# Patient Record
Sex: FEMALE | Race: WHITE
[De-identification: names, ages, dates, MRNs, and addresses within clinical notes are randomized per-mention and may not be internally consistent; named-entity substitution may affect disease eponyms.]

---

## 2020-04-23 NOTE — D
[facility]
Kristin Saucedo
[place]   [address]                     DISCHARGE SUMMARY             
_______________________________________________________________________________
 
Name:       CAROL ALAS LEIDY                 Room #:         441-P       [facility] [place]  
M.R.#:      [id number]                       Account #:      [id number]  
Admission:  06/01/20    Attend Phys:    Krystian Pacheco MD   
Discharge:  06/03/20    Date of Birth:  [birth year]  
                                                          Report #: [id number]
                                                                    [id number]   
_______________________________________________________________________________
THIS REPORT FOR:  
 
cc:  Apple Leblanc MD, Sara A. MD Clymer, David J. MD                                           ~
THIS REPORT FOR:   //name//                          
 
CC: Krystian Leblanc
 
DATE OF SERVICE:  06/03/2020
 
 
FINAL DIAGNOSIS:  End-stage degenerative arthritis of her right hip.
 
OPERATIVE PROCEDURE:  Right total hip arthroplasty.
 
HISTORY OF PRESENT ILLNESS:  This generally active, fit and healthy 54-year-old
female presents with severe early degenerative arthritis involving multiple
joints.  This involves both hips and both knees to some extent.  The right hip
is the most severe.  She has decided to go ahead with right total hip
arthroplasty.
 
HOSPITAL COURSE:  The patient was admitted and taken to the operating room on
06/01/2020.  She underwent right total hip replacement, which she tolerated
quite nicely.  Postoperatively, her course was largely unremarkable.  She was
able to quickly advanced from IV analgesics to oral analgesics and resume a
regular diet.  She participated with physical therapy and made excellent
progress.  She was started on anticoagulation using Xarelto.  She has excellent
assistance at home and felt quickly that she was able to manage and was anxious
for hospital discharge.
 
DISCHARGE MEDICATIONS:  Include Xarelto 10 mg daily and hydrocodone 10 mg q.8
hours p.r.n. for pain.  She will continue moderate activity at home with
assistance from family.  I have asked her to call me or return should there be
any problems or questions.  I will otherwise plan to see her back in my office
in about 1 week postoperatively for routine followup.
 
 
 
 
 
 
 
                         
   By:                               
                   
D: 06/04/20 1704                           _____________________________________
T: 06/04/20 1714                           Krystian Pacheco MD             /nt

## 2020-05-22 NOTE — EKG
[facility]
Kristin Saucedo
[place]   [address]                     ELECTROCARDIOGRAM REPORT      
_______________________________________________________________________________
 
Name:       HERNANDO ALASJESSIE FORBES                 Room #:                     PRE IN  
M.R.#:      [id number]                       Account #:      [id number]  
Admission:              Attend Phys:    Krystian Pacheco MD   
Discharge:              Date of Birth:  [birth year]  
                                                          Report #: [id number]
                                                                    [id number]
_______________________________________________________________________________
THIS REPORT FOR:  
 
cc:  Apple Leblanc MD, Sara A. MD Lundgren,Ozzie LESLIE MD [facility]                                        ~
THIS REPORT FOR:   //name//                          
 
                          [facility]
                                       
Test Date:    2020               Test Time:    09:17:35
Pat Name:     CAROL ALAS                Department:   
Patient ID:   [id number]            Room:          
Gender:       F                        Technician:   LONA
:          1965               Requested By: Krystian Pacheco
Order Number: [id number] MD:   Ozzie Cortez
                                 Measurements
Intervals                              Axis          
Rate:         55                       P:            27
FL:           172                      QRS:          48
QRSD:         90                       T:            33
QT:           445                                    
QTc:          426                                    
                           Interpretive Statements
Sinus bradycardia
Otherwise normal tracing
No previous ECG available for comparison
 
Electronically Signed On 2020 8:39:21 CDT by Ozzie Cortez
[email or web address]
 
 
 
 
 
 
 
 
 
 
 
 
 
 
 
  <ELECTRONICALLY SIGNED>
   By: Ozzie Cortez MD, [facility]   
  20     0839
D: 05/21/20 0917                           _____________________________________
T: 20                           Ozzie Cortez MD, [facility]     /EPI

## 2020-06-01 NOTE — NUR
PATIENT IS NEW TO UNIT ROOM 441. PT ALERT XS 4. USES BSC TO URINATE. EATING
SNACK PACK AND APPLESAUCE. DRINKS WATER AND SPRITE. PICCO DRESSING TO RIGHT
HIP. HAS HEMOVAC TO RIGHT HIP. PT GIVEN PRN PAIN MED. PT IS ROOM AIR AND
REGULAR DIET,

## 2020-06-01 NOTE — NUR
PATIENT ADMITTED FROM OR ARRIVED ON THE UNIT AT 1330. PATIENT ALERT AND
ORIENTED X4. PATIENT HAS RIGHT HIP REYNALDO DRESSING, SCD'S, NICK HOSE, AND ICE
PACK, ALSO HEMOVAC IN PLACE. LEFT FOREARM IV IN PLACE. PATIENT C/O PAIN WITH
RIGHT HIP AREA, 8/10, OXYCODONE 1 TABLET GIVEN. IV FLUIDS STARTED. ADMISSION
DONE, EXCEPT CAREPLAN. REPORT GIVEN TO LISSET/RN. GODFREY/PT WORKED WITH THE
PATIENT, SHE FELT LIKE SHE WAS GOING TO PASS OUT, ONLY WALKED 10 FEET. WILL
CONTINUE TO MONITOR.

## 2020-06-02 NOTE — NUR
ASSUMED PATIENT CARE AT 0700. PATIENT DEREK X4.HAD TOTAL RT HIP REPLACEMENT.
PAIN OF 5-6 ON RT HIP, WELL  CONTROLLED BY PAIN MEDS.
PAIN TOLERATING WELL ON AMBULATION AND ON STAND BY ASST.
HEMOVAC REMOVED, REYNALDO DRESSING, SCD'S AND ICE PACKS IN PLACE.
BED IN LOW POSITION, CALL LIGHT IN REACH, PATIENT WILL CALL FOR HELP, WILL
CONTINUE TO MONITOR.

## 2020-06-02 NOTE — NUR
ASSESSMENT: CM REVIEWED CHART AND SPOKE WITH PATIENT. PT IS ALERT AND ORIENTED
X4. PT IS S/P R TKA. PT REPORTS LIVING IN A HOUSE WITH  HER SPOUSE. PT REPORTS
HAVING ABOUT THREE STEPS TO ENTER WITH A HANDRAIL. PT REPORTS SHE NORMALLY
AMBULATES INDEPENDENTLY AND IS INDEPENDENT WITH ADLS. PT REPORTS HAVING A WALK
IN SHOWER. PT HAS BEEN AMBULATING WITH THERAPY AND IS NEEDING A WALKER FOR
HOME. CM SPOKE WITH PT AND SHE HAS NO PREFERENCE OF [employer]. CM GOT RX FOR
WALKER AND FAXED TO [facility] AND AWAITING FURTHER INPUT FROM THEM. PT
REPORTS SHE HAS NOT HAD HH IN THE PAST NOR BEENM TO A SNF. PHYSICAL THERAPY IS
RECOMMENDING HOME VS. OUTPATIENT. CM WILL CONTINUE TO FOLLOW TO ASSIST AS
NEEDED.

## 2020-06-02 NOTE — O
[facility]
Kristin Saucedo
[place]   [address]                     OPERATIVE REPORT              
_______________________________________________________________________________
 
Name:       CAROL ALAS                 Room #:         441-P       ADM [place]  
M.R.#:      [id number]                       Account #:      [id number]  
Admission:  06/01/20    Attend Phys:    Krystian Pacheco MD   
Discharge:              Date of Birth:  [birth year]  
                                                          Report #: [id number]
                                                                    [id number]   
_______________________________________________________________________________
THIS REPORT FOR:  
 
cc:  Apple Leblanc MD, Sara A. MD Clymer, David J. MD                                           ~
CC: Krystian Leblanc
 
DATE OF SERVICE:  06/01/2020
 
 
PREOPERATIVE DIAGNOSIS:  End-stage degenerative arthritis, right hip.
 
POSTOPERATIVE DIAGNOSIS:  End-stage degenerative arthritis, right hip.
 
PROCEDURE:  Right total hip arthroplasty.
 
SURGEON:  Krystian Pacheco MD
 
INDICATIONS:  This generally healthy, active 54-year-old female has severe early
degenerative arthritis in multiple joints.  The right hip demonstrates evidence
of some mild dysplasia with a shallow acetabulum and rather severe end-stage
degenerative arthritis with significant deformity of the femoral head and
complete loss of joint space.  Given these findings and symptoms, she has
elected to go ahead with right total hip replacement at this time.
 
DESCRIPTION OF PROCEDURE:  The patient was taken to the operating room where she
was placed under general anesthesia.  Prophylactic intravenous antibiotics were
administered.  She was positioned in the left lateral decubitus position and the
right hip, thigh and leg were meticulously prepped and draped.  A slightly
curving posterolateral skin incision was made centered over the greater
trochanter.  This was carried through subcutaneous tissues and fascia and
gluteus to expose the posterior aspect of the hip joint.  The short external
rotators and capsule were taken down and tagged with several #1 Tevdek sutures
and preserved.  The hip was dislocated posteriorly.  Marked degenerative change
on both the femoral head and acetabulum was noted.  There was rather extensive
spurring around the margin of the acetabulum, which was debrided.  A femoral
neck osteotomy was performed and the canal was opened with reamers and hand
broaches.  The Smith and Nephew hip system was utilized.  A size 12 standard
femoral stem seemed to fit quite nicely.  The trial broach was removed and
attention directed to the acetabulum.  Further debridement of the periacetabular
osteophytes was performed and the acetabulum was sequentially reamed, gradually
advancing to a 52 mm diameter reamer.  A Smith and Nephew size 52 StikTite
3-hole acetabular shell was selected.  This was inserted in alignment with her
true acetabulum, which positioned this in about 45 degrees off of vertical and
about 20-25 degrees of anteversion.  The cup seated nicely and appeared to be
 
 
 
[address]   [address]                     OPERATIVE REPORT              
_______________________________________________________________________________
 
Name:       CAROL ALAS LEIDY                 Room #:         441-P       [facility] [place]  
M.R.#:      [id number]                       Account #:      [id number]  
Admission:  06/01/20    Attend Phys:    Krystian Pacheco MD   
Discharge:              Date of Birth:  [birth year]  
                                                          Report #: [id number]
                                                                    [id number]   
_______________________________________________________________________________
very secure.  In addition, 2 cancellous screws were placed through the apical
holes engaging good periacetabular bone and adding to the overall stability.  A
36-mm polyethylene liner was then inserted, placing the 20-degree elevated rim
at about the 10 o'clock posterior position.  It was snapped into place and
seated nicely and appeared to be secure.  The permanent Smith and Nephew size 12
standard Synergy press fit femoral stem was then inserted.  This was positioned
about 15 degrees of anteversion.  It seated nicely and appeared to be secure. 
Trial reduction was performed and a +0 neck length seemed to fit nicely.  The 36
mm Oxinium head with a +0 neck length was selected.  This was impacted on the
Mendez taper.  The hip was reduced.  Alignment, range of motion, stability and
leg length were assessed and felt to be satisfactory.  Good hemostasis was
confirmed.  The wound was aggressively irrigated.  The capsule and short
external rotators were then repaired back to bone using #1 Tevdek sutures,
passed through several small drill holes in the greater trochanter.  A single
Hemovac was left in the wound exiting through a separate stab incision.  The
fascia was closed with multiple #1 Vicryl sutures.  The subcutaneous tissues
were closed with 0 Monocryl and the skin was closed with skin staples.  A
sterile dressing was applied.  The patient was awakened and returned to recovery
room in good condition.
 
 
 
 
 
 
 
 
 
 
 
 
 
 
 
 
 
 
 
 
 
 
 
 
 
  <ELECTRONICALLY SIGNED>
   By: Krystian Pacheco MD           
  06/02/20     0752
D: 06/01/20 1157                           _____________________________________
T: 06/01/20 1211                           Krystian Pacheco MD             /nt

## 2020-06-03 NOTE — NUR
ON-GOING ASSESSMENT: CM REVIEWED CHART AND SPOKE WITH PT. CM ALSO SPOKE WITH
[facility] TO GET A FOLLOW UP ON WALKER. THEY STATE THEY ARE
IN-NETWORK WITH INSURANCE BUT JOE WANTS THEM TO COMPLETE A FORM PRIOR TO
THEM APPROVING WALKER. THEY ARE GOING TO SUBMIT FORM AND WILL GET BACK WITH
CM. CM UPDATED PT. CM ALSO FAXED [facility] PATIENTS PHYSICAL THERAPY NOTE
RECOMMENDING A WALKER. CM SPOKE WITH PT WHO STATES HER OUTPATIENT THERAPY HAS
NOT BEEN SET UP. CM NOTIFIED BEDSIDE RN TO GET A RX FROM DR BRONSON FOR
OUTPATIENT PT/OT. CM ALSO CONTACTED [facility] OUT[facility]ER[facility] [phone] TO
GIVE THEM A HEADS UP OF REFERRAL. CM SPOKE WITH BEDSIDE RN WHO REPORTS DR. BRONSON WILL NOT BE BY UNTIL LATER THIS EVENING. CM NOTIFIED PT TO TAKE SCRIPT
TO OUTPT THERAPY. CM ALSO NOTIFIED TO CALL THEM TO SCHEDULE OUTPATIENT
THERAPY. PT REPORTS HAVING A FAMILY MEMBER WHO HAS A WALKER AT HOME SHE CAN
USE UNTIL [facility] PT GETS BACK TO HER. CM NOTIFIED [facility] PATIENT
OF PATIENTS HOME NUMBER, THEY STATE THEY ARE COMPLETING THE FORM AND FAXING TO
HER INSURANCE AND UNSURE HOW LONG IT WILL TAKE THEM TO HEAR BACK. PT REPORTS
HAVING A WALKER SHE CAN USE IN THE MEANTIME. PT REPORTS NO FURTHER QUESTIONS
FOR CM.

## 2020-06-03 NOTE — NUR
ASSUMED PATIENT CARE AT 0700. PT AXO X4, STAND BY ASST.CONCERN ABT THE
ALIGNMENT OF HER FEET- SHE SAYS LEGS OT EQUAL. PAIN MANAGED BY PAIN MEDS,
SITTING ON HER CHAIR MOST OF THE TIME. IV LEFT AC. PICCO DRESSING, SCD'S AND
ICE PACK IN PLACE.SHE SAID, SHE COMMUNICATES HER COND WITH FAMILY. CALL LIGHT
IN REACH, BED IN LOW POSITION, WILL CALL FOR HELP. WILL CONT TO MONITOR.

## 2020-06-03 NOTE — NUR
PT WAS OBSERVED WATCHING TV AT START OF SHIFT.ASSESSMENT COMPLETED.PT C/O PAIN
ON HER R HIP,MANAGED WITH MED.SCD AND NICK HOSE IN PLACE.PT COMPLAINED THAT
BOTH HER LEGS ARE NO LONGER EQUAL,PT STATED THAT SHE OBSERVED IT WHEN SHE
TRIED TO WALK.PT STATED THAT SHE WILL LET PHYSICIAN KNOW IN AM.DRSG TO HER HIP
C/D/I.REYNALDO DRSG AND ICE IN PLACE.PT RESTING ON HER BED AT THIS TIME.FALL
PRECAUTIONS IN PLACE,CALL LIGHT WITHIN REACH.

## 2020-12-14 NOTE — NUR
PT CARE ASSUMED FROM PACU AT 1330. A&Ox4. ADMISSION COMPLEETED. PAIN TOLERATED
WELL WITH PAIN MEDICATION ON BOARD. VITALS STABLE. IV PATENT WITH NO REDNESS
OR EDEMA, FLUIDS INFUSING. NICK HOSES/SCD'S/ ICE BAG IN PLACE. REYNALDO DRESSING
DRY AND INTACT. HEMOVAC DRAIN WELL 175ML. TOLERATING CLEAR LIQUIDS WELL
ADVANCED DIET. HIP PRECOUTIONS IN PLACE. OT/PT/RT ON BOARD. PT HAS GOTTEN UP
TO THE BEDSIDE COMMODE AND BECAME LIGHTHEADED. THIS WAS THE SAME WITH HER LAST
HIP SURGERY A FEW MONTHS BACK. PHYSICALY TOLERATING TRANSFER WELL WITH STANDBY
ASSIST. LATEX ALLERGY. FALL PROTOCOL IN PLACE. CALL LIGHT IN REACH. WILL
CONTINUE TO MONITOR.

## 2020-12-14 NOTE — NUR
ASSESSMENT: CM REVIEWED CHART AND SPOKE WITH PT. PT IS S/P LEFT THR. PT
REPORTS LIVING IN A HOUSE WITH HER SPOUSE BUT WILL BE STAYING WITH HER MOTHER
AT DISCHARGE AS THERE IS ONLY ONE STEP TO ENTER. PT REPORTS SHE HAS BEEN
BORROWING A WALKER. PT REPORTS SHE WOULD LIKE TO TRY TO GET ONE THROUGH
INSURANCE IF POSSIBLE. PER PAST RECORDS ATTEMPTED TO GET PT A WALKER THROUGH
[facility] BUT PT REPORTS THEY NEVER DELIVERED HER ONE. PT REPORTS THAT
HER SONS ARE THERASPIST AND DID HER THERAPY AT DISCHARGE AND REPORTS SHE IS
GOING TO DO THIS AGAIN. CM WILL CONTINUE TO FOLLOW TO ASSIST AS NEEDED. CM
ATTEMPTING TO REACH OUT TO [facility] IN REGARDS TO WALKER.

## 2020-12-15 NOTE — NUR
PT CARE ASSUMED AT 0700. A&Ox4. LATEX ALLERGY. HEMOVAC REMOVED. ICEPACK IN
PLACE. PT FAINTED WHEN AMBULATING WITH OT THIS AM. VITALS CHECKED AND STABLE.
PAIN TOLERATED WELL WITH PAIN MEDICATION ON BOARD. UP WALKING WITH PT AT NOON
WITHOUT GETTING LIGHT HEADED. FALL PROTOCOL IN PLACE. CALL LIGHT IN REACH. SON
AT BEDSIDE. WILL CONTINUE TO MONITOR.

## 2020-12-15 NOTE — O
[facility]
Kristin Saucedo
[place]   [address]                     OPERATIVE REPORT              
_______________________________________________________________________________
 
Name:       CAROL ALAS                 Room #:         437-P       ADM IN  
M.R.#:      [id number]                       Account #:      [id number]  
Admission:  12/14/20    Attend Phys:    Krystian Pacheco MD   
Discharge:              Date of Birth:  [birth year]  
                                                          Report #: [id number]
                                                                    [id number]   
_______________________________________________________________________________
THIS REPORT FOR:  
 
cc:  Apple Leblanc MD, Sara A. MD Clymer,Krystian SHAFER MD                                           ~
 
DATE OF SERVICE:  12/14/2020
 
 
PREOPERATIVE DIAGNOSIS:  End-stage degenerative arthritis, left hip.
 
POSTOPERATIVE DIAGNOSIS:  End-stage degenerative arthritis, left hip.
 
PROCEDURE:  Left total hip arthroplasty.
 
SURGEON:  Krystian Pacheco M.D.
 
INDICATIONS:  This 55-year-old female has rather severe progressive degenerative
arthritis involving both hips.  Previously she underwent a right total hip
replacement with good result.  She returns now for planned left hip replacement.
 
DESCRIPTION OF PROCEDURE:  The patient was taken to the operating room where she
was placed under general anesthesia.  Prophylactic intravenous antibiotics were
administered.  She was turned to the right lateral decubitus position.  The left
hip, thigh and leg were meticulously prepped and draped.  A slightly curving
skin incision was made centered over the greater trochanter.  This was extended
through subcutaneous adipose tissues, fascia and the short external rotators
were taken down and tagged with several #1 Tevdek sutures.  The hip was
dislocated posteriorly.  Marked degenerative change on both the femoral head and
acetabulum was noted.  The Smith and Nephew hip system was utilized.  A femoral
neck osteotomy was created and the canal was opened with reamers and hand
broaches.  A size 11 trial stem seemed to fit most nicely.  The calcar was
trimmed down to an appropriate level.  The trial component was removed and
attention directed to the acetabulum.  The acetabulum was sequentially reamed,
gradually advancing to a size 52 mm reamer.  A Smith and Nephew size 52, 3-hole
StikTite shell was selected.  This was impacted into the acetabulum in alignment
with her true acetabulum, placing this in about 45 degrees off of vertical and
about 20 degrees of anteversion.  It seated nicely and appeared to be secure. 
In addition, 3 cancellous screws were placed through the atypical holes with
good purchase on periacetabular bone adding to secure stability.  A 36-mm
diameter polyethylene liner was then inserted, placing the 20-degree elevated
rim at about the 10 o'clock posterior position.  It seated nicely and was
secure.  A trial reduction was performed and the hip was nicely stabilized when
using the size 11 femoral stem and a +4 mm neck length head.  The trial
component was removed and the permanent Smith and Nephew size 11 Synergy porous
femoral component was inserted, placing this in about 15-20 degrees of
 
 
 
[address]   [address]                     OPERATIVE REPORT              
_______________________________________________________________________________
 
Name:       CAROL ALAS                 Room #:         437-P       [facility] [place]  
M.R.#:      [id number]                       Account #:      [id number]  
Admission:  12/14/20    Attend Phys:    Krystian Pacheco MD   
Discharge:              Date of Birth:  [birth year]  
                                                          Report #: [id number]
                                                                    [id number]   
_______________________________________________________________________________
 
anteversion.  It seated nicely and appeared to be secure.  A femoral head was
then applied using the Oxinium 36 mm diameter head with a +4 mm neck length. 
The hip was reduced.  Alignment, range of motion, stability and leg length were
assessed and felt to be satisfactory.  The short external rotators were then
repaired also adding to hip stability.  A single Hemovac was left in the wound
exiting through a separate stab incision.  The fascia was closed with multiple
#1 Vicryl sutures.  The subcutaneous tissues were closed with 0 Monocryl.  The
skin was closed with skin staples.  A sterile dressing was applied.  The patient
was awakened and returned to recovery room in good condition.
 
 
 
 
 
 
 
 
 
 
 
 
 
 
 
 
 
 
 
 
 
 
 
 
 
 
 
 
 
 
 
 
 
 
  <ELECTRONICALLY SIGNED>
   By: Krystian Pacheco MD           
  12/15/20     0814
D: 12/14/20 1201                           _____________________________________
T: 12/14/20 1211                           Krystian Pacheco MD             /nt

## 2020-12-15 NOTE — NUR
RD received nutrition consult and pt visited this am. Pt notes she had passed
out due to skipping a meal, but her appetite is now doing well and eating at
baseline. No recent wt loss noted PTA and is currently 102% of reported usual
body wt. Pt with no nutritional questions/concerns, no nutritional
interventions planned at this time. Pt is low nutritional risk.

## 2020-12-15 NOTE — NUR
ALERT AND ORIENTED.VERY PLEASANT.LEFT HIP WITH REYNALDO C/D/I. ICE KAITLYNN
APPLIED.PAIN WELL MANAGED WITH OXYCODONE.SHE WAS ABLE TO GET TO THE BSC X1
THEN WALKED TO THE BATHROOM THEREFATER.HEMOVAC INTACT. AFEBRILE.DENIES ANY
N/V.VOIDING WITH NO DIFFICULTY.PROGRESSING TOWARDS CARE GOALS.CALL LIGHT
WITHIN REACH.

## 2020-12-16 NOTE — NUR
ON-GOING ASSESSMENT: PT HAS ORDERS TO DISCHARGE HOME TODAY WITH NO NEEDS. PTS
WALKER WAS IN HER ROOM FROM [facility].

## 2020-12-16 NOTE — NUR
PT CARE ASSUMED AT 0700. A&Ox4. REYNALDO DRESSING DRY AND INTACT. ICEPACK IN
PLACE. PAIN MANAGED WELL WITH PAIN CONTROL ON BOARD. IV REMOVED.
TEDHOSES/SCD'S IN PLACE. UP WITH OT/PT AND CLEARED. WALKER DELIVERED. PT
DISCHAREGED WITH NO FURTHER QUESTIONS. CALL LIGHT IN REACH. WILL CONTINUE TO
MONITOR UNTIL LEAVING.

## 2020-12-16 NOTE — NUR
late entry from 12/15/20: SADIE PROVIDED A WALKER TO PATIENT AND DELIVERED
IT TO HER ROOM FOR HER TO TAKE HOME AT DISCHARGE.

## 2020-12-16 NOTE — NUR
PT HAD INCREASED PAIN AT THE START OF SHIFT. WITH ORAL PAIN MEDS GIVEN Q2HRS
PRN, PATIENT REPORTS FEELING BETTER AND RATING PAIN AT AROUND 5/10  THIS
MORNING. WALKING WITH [facility] WALKER. VOIDING OK,

## 2020-12-17 NOTE — D
[facility]
Kristin Sau[place]   [address]                     DISCHARGE SUMMARY             
_______________________________________________________________________________
 
Name:       CAROL ALAS                 Room #:         437-P       [facility] [place]  
M.R.#:      [id number]                       Account #:      [id number]  
Admission:  12/14/20    Attend Phys:    Krystian Pacheco MD   
Discharge:  12/16/20    Date of Birth:  [birth year]  
                                                          Report #: [id number]
                                                                    [id number]   
_______________________________________________________________________________
THIS REPORT FOR:  
 
cc:  Apple Leblanc MD, Sara A. MD Clymer,Krystian SHAFER MD                                           ~
DATE OF SERVICE:  12/16/2020
 
 
FINAL DIAGNOSIS:  End-stage degenerative arthritis, left hip.
 
OPERATIVE PROCEDURES:  Left total hip arthroplasty.
 
HISTORY OF PRESENT ILLNESS:  This active and fit 55-year-old female presents
with progressive bilateral hip pain related to degenerative arthritis.  She
underwent previous right total hip replacement with good result.  She returns
now for planned left total hip replacement.
 
HOSPITAL COURSE:  The patient was admitted and taken to the operating room on
12/14/2020.  She underwent left total hip replacement and tolerated that well. 
Postoperatively, her course was largely unremarkable.  She did have some
weakness and lightheadedness, but this responded with time and rest and IV
fluids.  Her hemoglobin remained stable.  Her dressing remains dry.  Her Hemovac
output was minimal.  The drain was removed.  She was advanced to oral analgesics
and a regular diet.  She was started on Xarelto for anticoagulation.  She was
started on therapy and made excellent progress.  She seems safe and ready for
discharge home today on 12/16/2020.  She notes that she is doing nicely with her
walker and anticipates she will do well at home.  She has plenty of family
assistance and some of those family members are actual physical therapist. 
Given this, she feels she does not need any in-home therapy and will probably
not even go to outpatient therapy.
 
DISCHARGE MEDICATIONS:  Include Vyvanse 40 mg daily, magnesium oxide 500 mg
daily, vitamin D3 25 mcg daily, Xarelto 10 mg daily, hydrocodone 5 mg or 10 mg
every 6 hours as needed for pain.  She will call me if any problems or
questions.  I will plan to see her back in my office in 1 week for routine
followup.
 
 
 
 
 
 
 
 
  <ELECTRONICALLY SIGNED>
   By: Krystian Pacheco MD           
  12/17/20     [id number]
D: 12/16/20 0917                           _____________________________________
T: 12/16/20 0930                           Krystian Pacheco MD             /nt

## 2021-07-01 ENCOUNTER — HOSPITAL ENCOUNTER (OUTPATIENT)
Dept: HOSPITAL 35 - RAD | Age: 56
End: 2021-07-01
Attending: NURSE PRACTITIONER
Payer: COMMERCIAL

## 2021-07-01 DIAGNOSIS — M48.061: ICD-10-CM

## 2021-07-01 DIAGNOSIS — M51.36: ICD-10-CM

## 2021-07-01 DIAGNOSIS — Z00.00: Primary | ICD-10-CM

## 2021-07-01 DIAGNOSIS — M51.34: ICD-10-CM

## 2021-07-01 LAB
ALBUMIN SERPL-MCNC: 3.6 G/DL (ref 3.4–5)
ALT SERPL-CCNC: 29 U/L (ref 30–65)
ANION GAP SERPL CALC-SCNC: 8 MMOL/L (ref 7–16)
AST SERPL-CCNC: 20 U/L (ref 15–37)
BASOPHILS NFR BLD AUTO: 0.6 % (ref 0–2)
BILIRUB SERPL-MCNC: 0.3 MG/DL (ref 0.2–1)
BILIRUB UR-MCNC: NEGATIVE MG/DL
BUN SERPL-MCNC: 14 MG/DL (ref 7–18)
CALCIUM SERPL-MCNC: 8.6 MG/DL (ref 8.5–10.1)
CHLORIDE SERPL-SCNC: 103 MMOL/L (ref 98–107)
CHOLEST SERPL-MCNC: 244 MG/DL (ref ?–200)
CO2 SERPL-SCNC: 28 MMOL/L (ref 21–32)
COLOR UR: YELLOW
CREAT SERPL-MCNC: 0.7 MG/DL (ref 0.6–1)
EOSINOPHIL NFR BLD: 1.8 % (ref 0–3)
ERYTHROCYTE [DISTWIDTH] IN BLOOD BY AUTOMATED COUNT: 14.6 % (ref 10.5–14.5)
GLUCOSE SERPL-MCNC: 94 MG/DL (ref 74–106)
GRANULOCYTES NFR BLD MANUAL: 63.7 % (ref 36–66)
HCT VFR BLD CALC: 38.7 % (ref 37–47)
HDLC SERPL-MCNC: 87 MG/DL (ref 40–?)
HGB BLD-MCNC: 13.2 GM/DL (ref 12–15)
KETONES UR STRIP-MCNC: NEGATIVE MG/DL
LDLC SERPL-MCNC: 140 MG/DL (ref ?–100)
LYMPHOCYTES NFR BLD AUTO: 24.5 % (ref 24–44)
MCH RBC QN AUTO: 31.2 PG (ref 26–34)
MCHC RBC AUTO-ENTMCNC: 34.1 G/DL (ref 28–37)
MCV RBC: 91.4 FL (ref 80–100)
MONOCYTES NFR BLD: 9.4 % (ref 1–8)
NEUTROPHILS # BLD: 5.4 THOU/UL (ref 1.4–8.2)
PLATELET # BLD: 284 THOU/UL (ref 150–400)
POTASSIUM SERPL-SCNC: 4 MMOL/L (ref 3.5–5.1)
PROT SERPL-MCNC: 7.3 G/DL (ref 6.4–8.2)
RBC # BLD AUTO: 4.23 MIL/UL (ref 4.2–5)
RBC # UR STRIP: NEGATIVE /UL
SODIUM SERPL-SCNC: 139 MMOL/L (ref 136–145)
SP GR UR STRIP: 1.01 (ref 1–1.03)
TC:HDL: 2.8 RATIO
TRIGL SERPL-MCNC: 86 MG/DL (ref ?–150)
URINE CLARITY: CLEAR
URINE GLUCOSE-RANDOM*: NEGATIVE
URINE LEUKOCYTES-REFLEX: NEGATIVE
URINE NITRITE-REFLEX: NEGATIVE
URINE PROTEIN (DIPSTICK): NEGATIVE
UROBILINOGEN UR STRIP-ACNC: 0.2 E.U./DL (ref 0.2–1)
VLDLC SERPL CALC-MCNC: 17 MG/DL (ref ?–40)
WBC # BLD AUTO: 8.4 THOU/UL (ref 4–11)

## 2021-10-08 ENCOUNTER — HOSPITAL ENCOUNTER (OUTPATIENT)
Dept: HOSPITAL 35 - RAD | Age: 56
End: 2021-10-08
Attending: FAMILY MEDICINE
Payer: COMMERCIAL

## 2021-10-08 DIAGNOSIS — Z12.31: Primary | ICD-10-CM
